# Patient Record
Sex: MALE | Race: WHITE | NOT HISPANIC OR LATINO | URBAN - METROPOLITAN AREA
[De-identification: names, ages, dates, MRNs, and addresses within clinical notes are randomized per-mention and may not be internally consistent; named-entity substitution may affect disease eponyms.]

---

## 2017-05-26 ENCOUNTER — OUTPATIENT (OUTPATIENT)
Dept: OUTPATIENT SERVICES | Facility: HOSPITAL | Age: 72
LOS: 1 days | Discharge: HOME | End: 2017-05-26

## 2017-06-28 DIAGNOSIS — R91.1 SOLITARY PULMONARY NODULE: ICD-10-CM

## 2017-07-17 ENCOUNTER — OUTPATIENT (OUTPATIENT)
Dept: OUTPATIENT SERVICES | Facility: HOSPITAL | Age: 72
LOS: 1 days | Discharge: HOME | End: 2017-07-17

## 2017-07-17 DIAGNOSIS — N13.8 OTHER OBSTRUCTIVE AND REFLUX UROPATHY: ICD-10-CM

## 2017-07-17 DIAGNOSIS — N40.1 BENIGN PROSTATIC HYPERPLASIA WITH LOWER URINARY TRACT SYMPTOMS: ICD-10-CM

## 2017-07-17 DIAGNOSIS — E29.1 TESTICULAR HYPOFUNCTION: ICD-10-CM

## 2018-01-03 ENCOUNTER — OUTPATIENT (OUTPATIENT)
Dept: OUTPATIENT SERVICES | Facility: HOSPITAL | Age: 73
LOS: 1 days | Discharge: HOME | End: 2018-01-03

## 2018-01-03 DIAGNOSIS — R09.02 HYPOXEMIA: ICD-10-CM

## 2018-01-03 DIAGNOSIS — R06.02 SHORTNESS OF BREATH: ICD-10-CM

## 2018-02-13 ENCOUNTER — OUTPATIENT (OUTPATIENT)
Dept: OUTPATIENT SERVICES | Facility: HOSPITAL | Age: 73
LOS: 1 days | Discharge: HOME | End: 2018-02-13

## 2018-02-13 DIAGNOSIS — C34.90 MALIGNANT NEOPLASM OF UNSPECIFIED PART OF UNSPECIFIED BRONCHUS OR LUNG: ICD-10-CM

## 2018-02-13 DIAGNOSIS — R06.89 OTHER ABNORMALITIES OF BREATHING: ICD-10-CM

## 2018-11-08 ENCOUNTER — OUTPATIENT (OUTPATIENT)
Dept: OUTPATIENT SERVICES | Facility: HOSPITAL | Age: 73
LOS: 1 days | Discharge: HOME | End: 2018-11-08

## 2018-11-08 DIAGNOSIS — E11.65 TYPE 2 DIABETES MELLITUS WITH HYPERGLYCEMIA: ICD-10-CM

## 2019-04-09 ENCOUNTER — OUTPATIENT (OUTPATIENT)
Dept: OUTPATIENT SERVICES | Facility: HOSPITAL | Age: 74
LOS: 1 days | Discharge: HOME | End: 2019-04-09
Payer: MEDICARE

## 2019-04-09 DIAGNOSIS — R06.89 OTHER ABNORMALITIES OF BREATHING: ICD-10-CM

## 2019-04-09 PROCEDURE — 78452 HT MUSCLE IMAGE SPECT MULT: CPT | Mod: 26

## 2019-07-16 ENCOUNTER — OUTPATIENT (OUTPATIENT)
Dept: OUTPATIENT SERVICES | Facility: HOSPITAL | Age: 74
LOS: 1 days | Discharge: HOME | End: 2019-07-16

## 2019-07-16 DIAGNOSIS — E11.65 TYPE 2 DIABETES MELLITUS WITH HYPERGLYCEMIA: ICD-10-CM

## 2019-07-16 DIAGNOSIS — N13.8 OTHER OBSTRUCTIVE AND REFLUX UROPATHY: ICD-10-CM

## 2019-07-16 DIAGNOSIS — N40.1 BENIGN PROSTATIC HYPERPLASIA WITH LOWER URINARY TRACT SYMPTOMS: ICD-10-CM

## 2019-07-31 ENCOUNTER — OUTPATIENT (OUTPATIENT)
Dept: OUTPATIENT SERVICES | Facility: HOSPITAL | Age: 74
LOS: 1 days | Discharge: HOME | End: 2019-07-31

## 2019-07-31 DIAGNOSIS — N40.1 BENIGN PROSTATIC HYPERPLASIA WITH LOWER URINARY TRACT SYMPTOMS: ICD-10-CM

## 2019-07-31 DIAGNOSIS — N13.8 OTHER OBSTRUCTIVE AND REFLUX UROPATHY: ICD-10-CM

## 2019-10-09 ENCOUNTER — OUTPATIENT (OUTPATIENT)
Dept: OUTPATIENT SERVICES | Facility: HOSPITAL | Age: 74
LOS: 1 days | Discharge: HOME | End: 2019-10-09

## 2019-10-09 DIAGNOSIS — Z23 ENCOUNTER FOR IMMUNIZATION: ICD-10-CM

## 2019-10-14 ENCOUNTER — OUTPATIENT (OUTPATIENT)
Dept: OUTPATIENT SERVICES | Facility: HOSPITAL | Age: 74
LOS: 1 days | Discharge: HOME | End: 2019-10-14

## 2019-10-14 DIAGNOSIS — R79.9 ABNORMAL FINDING OF BLOOD CHEMISTRY, UNSPECIFIED: ICD-10-CM

## 2019-12-30 PROBLEM — Z00.00 ENCOUNTER FOR PREVENTIVE HEALTH EXAMINATION: Status: ACTIVE | Noted: 2019-12-30

## 2020-01-16 ENCOUNTER — APPOINTMENT (OUTPATIENT)
Dept: SURGERY | Facility: CLINIC | Age: 75
End: 2020-01-16
Payer: MEDICARE

## 2020-01-16 VITALS
SYSTOLIC BLOOD PRESSURE: 120 MMHG | TEMPERATURE: 98.4 F | HEIGHT: 69 IN | BODY MASS INDEX: 23.7 KG/M2 | WEIGHT: 160 LBS | HEART RATE: 71 BPM | DIASTOLIC BLOOD PRESSURE: 68 MMHG

## 2020-01-16 DIAGNOSIS — I10 ESSENTIAL (PRIMARY) HYPERTENSION: ICD-10-CM

## 2020-01-16 DIAGNOSIS — Z85.51 PERSONAL HISTORY OF MALIGNANT NEOPLASM OF BLADDER: ICD-10-CM

## 2020-01-16 DIAGNOSIS — E11.9 TYPE 2 DIABETES MELLITUS W/OUT COMPLICATIONS: ICD-10-CM

## 2020-01-16 DIAGNOSIS — Z80.0 FAMILY HISTORY OF MALIGNANT NEOPLASM OF DIGESTIVE ORGANS: ICD-10-CM

## 2020-01-16 DIAGNOSIS — Z78.9 OTHER SPECIFIED HEALTH STATUS: ICD-10-CM

## 2020-01-16 DIAGNOSIS — F17.200 NICOTINE DEPENDENCE, UNSPECIFIED, UNCOMPLICATED: ICD-10-CM

## 2020-01-16 DIAGNOSIS — E78.00 PURE HYPERCHOLESTEROLEMIA, UNSPECIFIED: ICD-10-CM

## 2020-01-16 PROCEDURE — 99214 OFFICE O/P EST MOD 30 MIN: CPT

## 2020-01-17 PROBLEM — Z80.0 FAMILY HISTORY OF MALIGNANT NEOPLASM OF COLON: Status: ACTIVE | Noted: 2020-01-16

## 2020-01-17 PROBLEM — I10 HYPERTENSION: Status: ACTIVE | Noted: 2020-01-16

## 2020-01-17 PROBLEM — E11.9 DIABETES: Status: ACTIVE | Noted: 2020-01-16

## 2020-01-17 PROBLEM — F17.200 CURRENT EVERY DAY SMOKER: Status: ACTIVE | Noted: 2020-01-16

## 2020-01-17 PROBLEM — Z85.51 HISTORY OF BLADDER CANCER: Status: RESOLVED | Noted: 2020-01-16 | Resolved: 2020-01-17

## 2020-01-17 PROBLEM — Z78.9 CAFFEINE USE: Status: ACTIVE | Noted: 2020-01-16

## 2020-01-17 PROBLEM — E78.00 HIGH CHOLESTEROL: Status: ACTIVE | Noted: 2020-01-16

## 2020-01-17 RX ORDER — GABAPENTIN 800 MG/1
800 TABLET, COATED ORAL
Refills: 0 | Status: ACTIVE | COMMUNITY

## 2020-01-17 RX ORDER — BUDESONIDE AND FORMOTEROL FUMARATE DIHYDRATE 160; 4.5 UG/1; UG/1
160-4.5 AEROSOL RESPIRATORY (INHALATION)
Refills: 0 | Status: ACTIVE | COMMUNITY

## 2020-01-17 RX ORDER — AMLODIPINE BESYLATE 5 MG/1
5 TABLET ORAL
Refills: 0 | Status: ACTIVE | COMMUNITY

## 2020-01-17 RX ORDER — DOXYCYCLINE HYCLATE 100 MG/1
100 CAPSULE ORAL
Refills: 0 | Status: ACTIVE | COMMUNITY

## 2020-01-17 RX ORDER — LEFLUNOMIDE 20 MG/1
20 TABLET, FILM COATED ORAL
Refills: 0 | Status: ACTIVE | COMMUNITY

## 2020-01-17 RX ORDER — DUTASTERIDE 0.5 MG/1
0.5 CAPSULE, LIQUID FILLED ORAL
Refills: 0 | Status: ACTIVE | COMMUNITY

## 2020-01-17 RX ORDER — PRAVASTATIN SODIUM 40 MG/1
40 TABLET ORAL
Refills: 0 | Status: ACTIVE | COMMUNITY

## 2020-01-17 RX ORDER — PANTOPRAZOLE 40 MG/1
40 TABLET, DELAYED RELEASE ORAL
Refills: 0 | Status: ACTIVE | COMMUNITY

## 2020-01-17 RX ORDER — METOPROLOL TARTRATE 50 MG/1
50 TABLET, FILM COATED ORAL
Refills: 0 | Status: ACTIVE | COMMUNITY

## 2020-01-17 RX ORDER — PAROXETINE HYDROCHLORIDE 40 MG/1
40 TABLET, FILM COATED ORAL
Refills: 0 | Status: ACTIVE | COMMUNITY

## 2020-01-17 RX ORDER — OXYCODONE HYDROCHLORIDE 30 MG/1
30 TABLET ORAL
Refills: 0 | Status: ACTIVE | COMMUNITY

## 2020-01-17 RX ORDER — ALBUTEROL SULFATE 90 UG/1
108 (90 BASE) INHALANT RESPIRATORY (INHALATION)
Refills: 0 | Status: ACTIVE | COMMUNITY

## 2020-01-17 RX ORDER — SITAGLIPTIN 50 MG/1
50 TABLET, FILM COATED ORAL
Refills: 0 | Status: ACTIVE | COMMUNITY

## 2020-01-17 RX ORDER — ALLOPURINOL 100 MG/1
100 TABLET ORAL
Refills: 0 | Status: ACTIVE | COMMUNITY

## 2020-01-17 NOTE — ASSESSMENT
[FreeTextEntry1] : Recurrent left inguinal hernia, for which elective repair is advised, so as to try to avoid the possible complications of an untreated hernia, which were explained. A laparoscopic approach is advised and he was referred to Dr. Mai for this purpose as I do not perform that surgery. This will need to be coordinated with his medical attendings and the orthopedic surgeons. All questions were answered and they understand and agree.  Appropriate precautions and warning signs were advised for the interim.

## 2020-01-17 NOTE — PHYSICAL EXAM
[Normal Breath Sounds] : Normal breath sounds [Wheezing] : wheezing was heard [Normal Thyroid] : the thyroid was normal [No HSM] : no hepatosplenomegaly [Abdomen Tenderness] : ~T ~M No abdominal tenderness [Abdominal Masses] : No abdominal masses [de-identified] : no adenopathy [de-identified] : Soft and not distended. There is some scar density palpable at the upper end of the prior left inguinal hernia surgery which is mildly tender, but there were no local acute changes. The patient is only able to get to the exam table and stands with assistance. With coughing and straining no femoral hernias or right inguinal hernia are noted. There is a palpable recurrence of the left inguinal hernia which does not descend to the scrotum. External genitalia otherwise unremarkable.

## 2020-01-17 NOTE — REASON FOR VISIT
[Initial Evaluation] : an initial evaluation [Friend] : friend [Spouse] : spouse [FreeTextEntry1] : recurrent left inguinal hernia

## 2020-01-17 NOTE — PHYSICAL EXAM
[Normal Breath Sounds] : Normal breath sounds [Normal Thyroid] : the thyroid was normal [Wheezing] : wheezing was heard [No HSM] : no hepatosplenomegaly [Abdomen Tenderness] : ~T ~M No abdominal tenderness [Abdominal Masses] : No abdominal masses [de-identified] : no adenopathy [de-identified] : Soft and not distended. There is some scar density palpable at the upper end of the prior left inguinal hernia surgery which is mildly tender, but there were no local acute changes. The patient is only able to get to the exam table and stands with assistance. With coughing and straining no femoral hernias or right inguinal hernia are noted. There is a palpable recurrence of the left inguinal hernia which does not descend to the scrotum. External genitalia otherwise unremarkable.

## 2020-01-17 NOTE — HISTORY OF PRESENT ILLNESS
[de-identified] : The patient had a left inguinal hernia repair with mesh by me in August of 2017. He had no problems thereafter. He now complains of some intermittent burning pain in the left groin region for the past one to 2 months along with a lump that he can sometimes feel. This may have occurred after a recent fall. He denies any other local symptoms or changes and had no recent changes in her bowel or bladder habit.  He has not had a colonoscopy since that surgery but did have a GI evaluation and by description had a Cologuard evaluation.

## 2020-01-17 NOTE — HISTORY OF PRESENT ILLNESS
[de-identified] : The patient had a left inguinal hernia repair with mesh by me in August of 2017. He had no problems thereafter. He now complains of some intermittent burning pain in the left groin region for the past one to 2 months along with a lump that he can sometimes feel. This may have occurred after a recent fall. He denies any other local symptoms or changes and had no recent changes in her bowel or bladder habit.  He has not had a colonoscopy since that surgery but did have a GI evaluation and by description had a Cologuard evaluation.

## 2020-01-17 NOTE — REASON FOR VISIT
[Initial Evaluation] : an initial evaluation [Spouse] : spouse [Friend] : friend [FreeTextEntry1] : recurrent left inguinal hernia

## 2020-02-05 ENCOUNTER — APPOINTMENT (OUTPATIENT)
Dept: SURGERY | Facility: CLINIC | Age: 75
End: 2020-02-05
Payer: MEDICARE

## 2020-02-05 VITALS
WEIGHT: 160 LBS | BODY MASS INDEX: 23.7 KG/M2 | TEMPERATURE: 98 F | HEIGHT: 69 IN | SYSTOLIC BLOOD PRESSURE: 100 MMHG | DIASTOLIC BLOOD PRESSURE: 69 MMHG | HEART RATE: 98 BPM

## 2020-02-05 DIAGNOSIS — K40.91 UNILATERAL INGUINAL HERNIA, W/OUT OBSTRUCTION OR GANGRENE, RECURRENT: ICD-10-CM

## 2020-02-05 DIAGNOSIS — S46.219A STRAIN OF MUSCLE, FASCIA AND TENDON OF OTHER PARTS OF BICEPS, UNSPECIFIED ARM, INITIAL ENCOUNTER: ICD-10-CM

## 2020-02-05 PROCEDURE — 99214 OFFICE O/P EST MOD 30 MIN: CPT

## 2020-02-12 PROBLEM — K40.91 RECURRENT LEFT INGUINAL HERNIA: Status: ACTIVE | Noted: 2020-01-17

## 2020-02-18 PROBLEM — S46.219A BICEPS MUSCLE TEAR: Status: ACTIVE | Noted: 2020-02-18

## 2020-02-18 NOTE — PHYSICAL EXAM
[JVD] : no jugular venous distention  [Respiratory Effort] : normal respiratory effort [Purpura] : purpura [Alert] : alert [Calm] : calm [de-identified] : in a wheel chair [de-identified] : Normal [de-identified] : left inguinal cough impulse - reducible hernia.  [de-identified] : left biceps ecchimosis, right leg immobilizer

## 2020-02-18 NOTE — ASSESSMENT
[FreeTextEntry1] :  Addy had a left inguinal hernia repair by Dr. Malloy. \par The hernia has reoccured \par Causes him minimal discomfort. \par He has a right knee issue - he is with a brace and is waiting for his next surgery. \par He also spontaneously tore his left biceps. \par \par exam : left inguinal cough impulse - reducible hernia. \par left biceps ecchymosis, right leg immobilizer\par \par We explained in great detail the pathophysiology of the disease process. We luiz diagrams and discussed the workup for diagnosis and management.\par The various options were explained to the patient. The Risk , benefit and alternatives were discussed. We discussed recovery and possible complications.\par \par We discused various things for over 45 min. \par We will first get his knee surgery \par We will let him heal from the bicep tear. \par He will then follow up with me and we will discuss about surgery. \par We discussed about the various risks of observation. \par \par We discussed the importance of close follow up. \par We informed that he needs to follow up in 2-3 months.\par We also informed that he can call us if anything changes or has any questions.\par

## 2020-02-18 NOTE — HISTORY OF PRESENT ILLNESS
[de-identified] : Addy had a left inguinal hernia repair by Dr. Malloy. \par The hernia has reoccured \par Causes him minimal discomfort. \par He has a right knee issuew - he is with a brace and is waiting for his next surgery. \par He also spontaneously tore his left biceps.

## 2020-04-10 ENCOUNTER — APPOINTMENT (OUTPATIENT)
Dept: SURGERY | Facility: CLINIC | Age: 75
End: 2020-04-10